# Patient Record
Sex: MALE | Race: OTHER | HISPANIC OR LATINO | URBAN - METROPOLITAN AREA
[De-identification: names, ages, dates, MRNs, and addresses within clinical notes are randomized per-mention and may not be internally consistent; named-entity substitution may affect disease eponyms.]

---

## 2017-05-18 ENCOUNTER — EMERGENCY (EMERGENCY)
Facility: HOSPITAL | Age: 27
LOS: 1 days | Discharge: HOME | End: 2017-05-18
Attending: EMERGENCY MEDICINE | Admitting: EMERGENCY MEDICINE
Payer: COMMERCIAL

## 2017-05-18 VITALS
DIASTOLIC BLOOD PRESSURE: 81 MMHG | TEMPERATURE: 98 F | OXYGEN SATURATION: 100 % | RESPIRATION RATE: 16 BRPM | HEART RATE: 88 BPM | SYSTOLIC BLOOD PRESSURE: 118 MMHG

## 2017-05-18 PROCEDURE — 99284 EMERGENCY DEPT VISIT MOD MDM: CPT

## 2017-05-18 NOTE — ED ADULT TRIAGE NOTE - CHIEF COMPLAINT QUOTE
pt c/o LLQ abdominal pain since Tuesday. pt c/o nausea but no vomiting. pt went to urgent care and states has food poision. pt appears comfortable and in NAD . pt hx appendectomy. pt denies any urinary symptoms.

## 2017-05-19 VITALS
OXYGEN SATURATION: 100 % | RESPIRATION RATE: 18 BRPM | HEART RATE: 67 BPM | DIASTOLIC BLOOD PRESSURE: 77 MMHG | TEMPERATURE: 98 F | SYSTOLIC BLOOD PRESSURE: 115 MMHG

## 2017-05-19 LAB — OB PNL STL: NEGATIVE — SIGNIFICANT CHANGE UP

## 2017-05-19 RX ORDER — CIPROFLOXACIN LACTATE 400MG/40ML
500 VIAL (ML) INTRAVENOUS ONCE
Qty: 0 | Refills: 0 | Status: COMPLETED | OUTPATIENT
Start: 2017-05-19 | End: 2017-05-19

## 2017-05-19 RX ORDER — MOXIFLOXACIN HYDROCHLORIDE TABLETS, 400 MG 400 MG/1
1 TABLET, FILM COATED ORAL
Qty: 10 | Refills: 0 | OUTPATIENT
Start: 2017-05-19 | End: 2017-05-24

## 2017-05-19 RX ADMIN — Medication 500 MILLIGRAM(S): at 02:09

## 2017-05-19 NOTE — ED PROVIDER NOTE - MEDICAL DECISION MAKING DETAILS
llq, +n, recently had abnormal tasting chicken, diarrhea is improving, afebrile, llq, +n, recently had abnormal tasting chicken, diarrhea is improving, afebrile, possible colitis, will check occult blood and dc with cipro, and pmd f/u

## 2017-05-19 NOTE — ED PROVIDER NOTE - OBJECTIVE STATEMENT
26M c/o LLQ abdominal pain since Tuesday. sharp pain, + passing gas; + mild nausea but no vomiting. + diarrhea - 3 episodes - watery, not brbpr; + dark stools; not taking any medications; no fever; + recently in [place], came back sunday; no sick contacts; no hematuria; went to urgent care on Tuesday, had a abnormal tasting piece of chicken; has BM this am; pain is 7/10, worse when passing gas; no testicular pain

## 2017-05-19 NOTE — ED ADULT NURSE NOTE - OBJECTIVE STATEMENT
pt. came in c/o LLQ pain x 3 days, non-radiating. denies any n/v/dysuria nor fever. pt. c/o having dark brown watery stools 2x. pt. states he might have had food poison. pt. was in [place] x 4 days, just arrived last Sunday, denies any sick contacts. awaits further MD bowles. will continue to monitor

## 2018-06-11 ENCOUNTER — EMERGENCY (EMERGENCY)
Facility: HOSPITAL | Age: 28
LOS: 1 days | Discharge: HOME | End: 2018-06-11
Attending: EMERGENCY MEDICINE
Payer: COMMERCIAL

## 2018-06-11 VITALS
TEMPERATURE: 98 F | RESPIRATION RATE: 16 BRPM | SYSTOLIC BLOOD PRESSURE: 124 MMHG | OXYGEN SATURATION: 98 % | HEART RATE: 70 BPM | DIASTOLIC BLOOD PRESSURE: 75 MMHG

## 2018-06-11 VITALS
TEMPERATURE: 98 F | SYSTOLIC BLOOD PRESSURE: 119 MMHG | OXYGEN SATURATION: 100 % | RESPIRATION RATE: 16 BRPM | HEART RATE: 71 BPM | WEIGHT: 177.91 LBS | DIASTOLIC BLOOD PRESSURE: 81 MMHG

## 2018-06-11 DIAGNOSIS — Z90.49 ACQUIRED ABSENCE OF OTHER SPECIFIED PARTS OF DIGESTIVE TRACT: Chronic | ICD-10-CM

## 2018-06-11 LAB
ALBUMIN SERPL ELPH-MCNC: 4.3 G/DL — SIGNIFICANT CHANGE UP (ref 3.3–5)
ALP SERPL-CCNC: 71 U/L — SIGNIFICANT CHANGE UP (ref 40–120)
ALT FLD-CCNC: 27 U/L — SIGNIFICANT CHANGE UP (ref 10–45)
ANION GAP SERPL CALC-SCNC: 15 MMOL/L — SIGNIFICANT CHANGE UP (ref 5–17)
APPEARANCE UR: CLEAR — SIGNIFICANT CHANGE UP
AST SERPL-CCNC: 20 U/L — SIGNIFICANT CHANGE UP (ref 10–40)
BASOPHILS # BLD AUTO: 0 K/UL — SIGNIFICANT CHANGE UP (ref 0–0.2)
BASOPHILS NFR BLD AUTO: 0.3 % — SIGNIFICANT CHANGE UP (ref 0–2)
BILIRUB SERPL-MCNC: 0.7 MG/DL — SIGNIFICANT CHANGE UP (ref 0.2–1.2)
BILIRUB UR-MCNC: NEGATIVE — SIGNIFICANT CHANGE UP
BUN SERPL-MCNC: 12 MG/DL — SIGNIFICANT CHANGE UP (ref 7–23)
CALCIUM SERPL-MCNC: 9.3 MG/DL — SIGNIFICANT CHANGE UP (ref 8.4–10.5)
CHLORIDE SERPL-SCNC: 103 MMOL/L — SIGNIFICANT CHANGE UP (ref 96–108)
CO2 SERPL-SCNC: 21 MMOL/L — LOW (ref 22–31)
COLOR SPEC: YELLOW — SIGNIFICANT CHANGE UP
CREAT SERPL-MCNC: 0.7 MG/DL — SIGNIFICANT CHANGE UP (ref 0.5–1.3)
DIFF PNL FLD: NEGATIVE — SIGNIFICANT CHANGE UP
EOSINOPHIL # BLD AUTO: 0.2 K/UL — SIGNIFICANT CHANGE UP (ref 0–0.5)
EOSINOPHIL NFR BLD AUTO: 1.6 % — SIGNIFICANT CHANGE UP (ref 0–6)
GAS PNL BLDV: SIGNIFICANT CHANGE UP
GLUCOSE SERPL-MCNC: 87 MG/DL — SIGNIFICANT CHANGE UP (ref 70–99)
GLUCOSE UR QL: NEGATIVE — SIGNIFICANT CHANGE UP
HCT VFR BLD CALC: 44.3 % — SIGNIFICANT CHANGE UP (ref 39–50)
HGB BLD-MCNC: 14.1 G/DL — SIGNIFICANT CHANGE UP (ref 13–17)
KETONES UR-MCNC: NEGATIVE — SIGNIFICANT CHANGE UP
LEUKOCYTE ESTERASE UR-ACNC: NEGATIVE — SIGNIFICANT CHANGE UP
LIDOCAIN IGE QN: 15 U/L — SIGNIFICANT CHANGE UP (ref 7–60)
LYMPHOCYTES # BLD AUTO: 18.6 % — SIGNIFICANT CHANGE UP (ref 13–44)
LYMPHOCYTES # BLD AUTO: 2.1 K/UL — SIGNIFICANT CHANGE UP (ref 1–3.3)
MCHC RBC-ENTMCNC: 27.6 PG — SIGNIFICANT CHANGE UP (ref 27–34)
MCHC RBC-ENTMCNC: 31.9 GM/DL — LOW (ref 32–36)
MCV RBC AUTO: 86.7 FL — SIGNIFICANT CHANGE UP (ref 80–100)
MONOCYTES # BLD AUTO: 1 K/UL — HIGH (ref 0–0.9)
MONOCYTES NFR BLD AUTO: 9.3 % — SIGNIFICANT CHANGE UP (ref 2–14)
NEUTROPHILS # BLD AUTO: 7.9 K/UL — HIGH (ref 1.8–7.4)
NEUTROPHILS NFR BLD AUTO: 70.2 % — SIGNIFICANT CHANGE UP (ref 43–77)
NITRITE UR-MCNC: NEGATIVE — SIGNIFICANT CHANGE UP
PH UR: 6.5 — SIGNIFICANT CHANGE UP (ref 5–8)
PLATELET # BLD AUTO: 226 K/UL — SIGNIFICANT CHANGE UP (ref 150–400)
POTASSIUM SERPL-MCNC: 4.4 MMOL/L — SIGNIFICANT CHANGE UP (ref 3.5–5.3)
POTASSIUM SERPL-SCNC: 4.4 MMOL/L — SIGNIFICANT CHANGE UP (ref 3.5–5.3)
PROT SERPL-MCNC: 8.3 G/DL — SIGNIFICANT CHANGE UP (ref 6–8.3)
PROT UR-MCNC: NEGATIVE — SIGNIFICANT CHANGE UP
RBC # BLD: 5.1 M/UL — SIGNIFICANT CHANGE UP (ref 4.2–5.8)
RBC # FLD: 12.2 % — SIGNIFICANT CHANGE UP (ref 10.3–14.5)
SODIUM SERPL-SCNC: 139 MMOL/L — SIGNIFICANT CHANGE UP (ref 135–145)
SP GR SPEC: 1.02 — SIGNIFICANT CHANGE UP (ref 1.01–1.02)
UROBILINOGEN FLD QL: NEGATIVE — SIGNIFICANT CHANGE UP
WBC # BLD: 11.2 K/UL — HIGH (ref 3.8–10.5)
WBC # FLD AUTO: 11.2 K/UL — HIGH (ref 3.8–10.5)

## 2018-06-11 PROCEDURE — 99284 EMERGENCY DEPT VISIT MOD MDM: CPT

## 2018-06-11 PROCEDURE — 80053 COMPREHEN METABOLIC PANEL: CPT

## 2018-06-11 PROCEDURE — 83605 ASSAY OF LACTIC ACID: CPT

## 2018-06-11 PROCEDURE — 74177 CT ABD & PELVIS W/CONTRAST: CPT | Mod: 26

## 2018-06-11 PROCEDURE — 82947 ASSAY GLUCOSE BLOOD QUANT: CPT

## 2018-06-11 PROCEDURE — 83690 ASSAY OF LIPASE: CPT

## 2018-06-11 PROCEDURE — 85027 COMPLETE CBC AUTOMATED: CPT

## 2018-06-11 PROCEDURE — 74177 CT ABD & PELVIS W/CONTRAST: CPT

## 2018-06-11 PROCEDURE — 84132 ASSAY OF SERUM POTASSIUM: CPT

## 2018-06-11 PROCEDURE — 82330 ASSAY OF CALCIUM: CPT

## 2018-06-11 PROCEDURE — 82803 BLOOD GASES ANY COMBINATION: CPT

## 2018-06-11 PROCEDURE — 85014 HEMATOCRIT: CPT

## 2018-06-11 PROCEDURE — 84295 ASSAY OF SERUM SODIUM: CPT

## 2018-06-11 PROCEDURE — 82435 ASSAY OF BLOOD CHLORIDE: CPT

## 2018-06-11 PROCEDURE — 81003 URINALYSIS AUTO W/O SCOPE: CPT

## 2018-06-11 NOTE — ED ADULT NURSE NOTE - OBJECTIVE STATEMENT
27 y m came to the ed from his pmd c/o llq abdominal pain. patient states the pain started saturday and has been intermittent dull pain. patient is a/ox3. denies any fevers, chills, chest pain, sob, n/v/d. abdomen is soft and nontender. denies decreased po intake. skin is warm and dry. denies pain/burning on urination.

## 2018-06-11 NOTE — ED PROVIDER NOTE - MEDICAL DECISION MAKING DETAILS
26 yo M presenting with LLQ pain, hx diverticulitis, well appearing on exam, cbc, cmp, CT abd, likely d/c with abx for uncomplicated diverticulitis 26 yo M presenting with LLQ pain, hx diverticulitis, well appearing on exam, cbc, cmp, CT abd, likely d/c with abx for uncomplicated diverticulitis    ADRIANNA Au MD: Pt is a 28 y/o M with hx diverticulitis (dx 1 yr ago clinically, no CT performed), appendectomy, who p/w 6/10 LLQ abdominal pain x 3 days, no f/c/n/v/diarrhea/constipation. + bloating, and pain wosre with passing gas- becomes sharp in nature. Denies passing blood in stool or dark tarry stools. Last BM this morning, normal consistency. DDx: diverticulitis, constipation, uti, other intraabdominal process. Plan: basic labs, CT A/P, pain control

## 2018-06-11 NOTE — ED ADULT NURSE REASSESSMENT NOTE - NS ED NURSE REASSESS COMMENT FT1
patient is resting in the room waiting for dispo. c/o minor abdominal pain but denies the need for pain medication. vss/nad. will continue to monitor.

## 2018-06-11 NOTE — ED PROVIDER NOTE - OBJECTIVE STATEMENT
28 yo M hx diverticulitis, appendectomy, presenting with 6/10 LLQ abdominal pain x 3 days, no f/c/n/v/diarrhea/constipation. + bloating, and pain wosre with passing gas- becomes sharp in nature. Denies passing blood in stool or dark tarry stools. Last BM this morning, normal consistency.         PCP Dr. Kobi Benjamin

## 2018-06-11 NOTE — ED PROVIDER NOTE - PLAN OF CARE
You were seen and evaluated in the emergency department for abdominal pain. You had a thorough evaluation including an exam, labs and imaging. You were given medications for comfort. Your workup did not demonstrate diverticulitis.   It is important to understand that while your workup was reassuring, no workup can completely exclude all concerning conditions. Therfore, please return if you develop worsening or concerning new symptoms including worsening pain, pain that spreads to new places, inability to tolerate an oral diet, new and worsening fevers and chills, weakness, inability to pass stool or flatulent gas, those included on the attached information sheets, or other findings concerning to you. Please take all your medications as prescribed.    Please be sure to follow up with your regular doctor in the next 2 days.

## 2018-06-11 NOTE — ED PROVIDER NOTE - CARE PLAN
Assessment and plan of treatment:	You were seen and evaluated in the emergency department for abdominal pain. You had a thorough evaluation including an exam, labs and imaging. You were given medications for comfort. Your workup did not demonstrate diverticulitis.   It is important to understand that while your workup was reassuring, no workup can completely exclude all concerning conditions. Therfore, please return if you develop worsening or concerning new symptoms including worsening pain, pain that spreads to new places, inability to tolerate an oral diet, new and worsening fevers and chills, weakness, inability to pass stool or flatulent gas, those included on the attached information sheets, or other findings concerning to you. Please take all your medications as prescribed.    Please be sure to follow up with your regular doctor in the next 2 days. Principal Discharge DX:	Abdominal pain  Assessment and plan of treatment:	You were seen and evaluated in the emergency department for abdominal pain. You had a thorough evaluation including an exam, labs and imaging. You were given medications for comfort. Your workup did not demonstrate diverticulitis.   It is important to understand that while your workup was reassuring, no workup can completely exclude all concerning conditions. Therfore, please return if you develop worsening or concerning new symptoms including worsening pain, pain that spreads to new places, inability to tolerate an oral diet, new and worsening fevers and chills, weakness, inability to pass stool or flatulent gas, those included on the attached information sheets, or other findings concerning to you. Please take all your medications as prescribed.    Please be sure to follow up with your regular doctor in the next 2 days. Principal Discharge DX:	Epiploic appendagitis  Assessment and plan of treatment:	You were seen and evaluated in the emergency department for abdominal pain. You had a thorough evaluation including an exam, labs and imaging. You were given medications for comfort. Your workup did not demonstrate diverticulitis.   It is important to understand that while your workup was reassuring, no workup can completely exclude all concerning conditions. Therfore, please return if you develop worsening or concerning new symptoms including worsening pain, pain that spreads to new places, inability to tolerate an oral diet, new and worsening fevers and chills, weakness, inability to pass stool or flatulent gas, those included on the attached information sheets, or other findings concerning to you. Please take all your medications as prescribed.    Please be sure to follow up with your regular doctor in the next 2 days.

## 2019-01-24 ENCOUNTER — INPATIENT (INPATIENT)
Facility: HOSPITAL | Age: 29
LOS: 0 days | Discharge: HOME | DRG: 313 | End: 2019-01-25
Attending: INTERNAL MEDICINE | Admitting: INTERNAL MEDICINE
Payer: COMMERCIAL

## 2019-01-24 VITALS
OXYGEN SATURATION: 96 % | WEIGHT: 190.92 LBS | HEART RATE: 92 BPM | SYSTOLIC BLOOD PRESSURE: 127 MMHG | RESPIRATION RATE: 16 BRPM | DIASTOLIC BLOOD PRESSURE: 83 MMHG | TEMPERATURE: 98 F

## 2019-01-24 DIAGNOSIS — Z90.49 ACQUIRED ABSENCE OF OTHER SPECIFIED PARTS OF DIGESTIVE TRACT: Chronic | ICD-10-CM

## 2019-01-24 DIAGNOSIS — I40.9 ACUTE MYOCARDITIS, UNSPECIFIED: ICD-10-CM

## 2019-01-24 LAB
ALBUMIN SERPL ELPH-MCNC: 3.9 G/DL — SIGNIFICANT CHANGE UP (ref 3.5–5)
ALP SERPL-CCNC: 81 U/L — SIGNIFICANT CHANGE UP (ref 40–120)
ALT FLD-CCNC: 48 U/L DA — SIGNIFICANT CHANGE UP (ref 10–60)
ANION GAP SERPL CALC-SCNC: 11 MMOL/L — SIGNIFICANT CHANGE UP (ref 5–17)
APTT BLD: 37.1 SEC — HIGH (ref 27.5–36.3)
AST SERPL-CCNC: 28 U/L — SIGNIFICANT CHANGE UP (ref 10–40)
BILIRUB SERPL-MCNC: 0.6 MG/DL — SIGNIFICANT CHANGE UP (ref 0.2–1.2)
BUN SERPL-MCNC: 8 MG/DL — SIGNIFICANT CHANGE UP (ref 7–18)
CALCIUM SERPL-MCNC: 9.4 MG/DL — SIGNIFICANT CHANGE UP (ref 8.4–10.5)
CHLORIDE SERPL-SCNC: 102 MMOL/L — SIGNIFICANT CHANGE UP (ref 96–108)
CO2 SERPL-SCNC: 26 MMOL/L — SIGNIFICANT CHANGE UP (ref 22–31)
CREAT SERPL-MCNC: 0.74 MG/DL — SIGNIFICANT CHANGE UP (ref 0.5–1.3)
GLUCOSE SERPL-MCNC: 103 MG/DL — HIGH (ref 70–99)
HCT VFR BLD CALC: 44.2 % — SIGNIFICANT CHANGE UP (ref 39–50)
HGB BLD-MCNC: 14.1 G/DL — SIGNIFICANT CHANGE UP (ref 13–17)
INR BLD: 1.03 RATIO — SIGNIFICANT CHANGE UP (ref 0.88–1.16)
MCHC RBC-ENTMCNC: 27.3 PG — SIGNIFICANT CHANGE UP (ref 27–34)
MCHC RBC-ENTMCNC: 31.8 GM/DL — LOW (ref 32–36)
MCV RBC AUTO: 85.9 FL — SIGNIFICANT CHANGE UP (ref 80–100)
PLATELET # BLD AUTO: 291 K/UL — SIGNIFICANT CHANGE UP (ref 150–400)
POTASSIUM SERPL-MCNC: 3.8 MMOL/L — SIGNIFICANT CHANGE UP (ref 3.5–5.3)
POTASSIUM SERPL-SCNC: 3.8 MMOL/L — SIGNIFICANT CHANGE UP (ref 3.5–5.3)
PROT SERPL-MCNC: 9.4 G/DL — HIGH (ref 6–8.3)
PROTHROM AB SERPL-ACNC: 11.4 SEC — SIGNIFICANT CHANGE UP (ref 10–12.9)
RBC # BLD: 5.15 M/UL — SIGNIFICANT CHANGE UP (ref 4.2–5.8)
RBC # FLD: 12.2 % — SIGNIFICANT CHANGE UP (ref 10.3–14.5)
SODIUM SERPL-SCNC: 139 MMOL/L — SIGNIFICANT CHANGE UP (ref 135–145)
TROPONIN I SERPL-MCNC: 2.9 NG/ML — HIGH (ref 0–0.04)
WBC # BLD: 14.9 K/UL — HIGH (ref 3.8–10.5)
WBC # FLD AUTO: 14.9 K/UL — HIGH (ref 3.8–10.5)

## 2019-01-24 PROCEDURE — 93010 ELECTROCARDIOGRAM REPORT: CPT | Mod: 76

## 2019-01-24 PROCEDURE — 99285 EMERGENCY DEPT VISIT HI MDM: CPT | Mod: 25

## 2019-01-24 PROCEDURE — 71045 X-RAY EXAM CHEST 1 VIEW: CPT | Mod: 26

## 2019-01-24 RX ORDER — ASPIRIN/CALCIUM CARB/MAGNESIUM 324 MG
324 TABLET ORAL ONCE
Qty: 0 | Refills: 0 | Status: COMPLETED | OUTPATIENT
Start: 2019-01-24 | End: 2019-01-24

## 2019-01-24 NOTE — ED PROVIDER NOTE - OBJECTIVE STATEMENT
28 male well, no pmh, fevers, cough, myalgia since 5 days ago, no cp or sob at that time, no n/v/d. approximately 2-3 hr pta developed sternal cp w no sob or pleuritic. no vomiting. no diaphoresis.

## 2019-01-24 NOTE — ED PROVIDER NOTE - PROGRESS NOTE DETAILS
reviewed the ekg w dr stephens. most likely myocarditis. repeated ekg is substantially unchanged. will admit for cardiac monitor and prob echo. flu swab ordered. considered pe but this is less likely - s1q3t3 minimally present

## 2019-01-24 NOTE — ED PROVIDER NOTE - PHYSICAL EXAMINATION
Gen: well appearing, of stated age, no acute distress; Head: NC, AT; ENT: MMM, no uvular deviation; Neck: supple with full ROM; Chest: CTAB, no retractions, rate normal, appears to breath comfortable; Heart: RRR S1S2 No JVD No peripheral edema b/l pulses 2+ in arm; Abd: Soft non-tender, no rebound or guarding, no masses, no hutchison sign, no mcburney tenderness; Back: No spinal deformity; Ext: Moving all 4 limbs without obvious impairment to ROM, no obvious weakness; Neuro: fluid speech, no focal deficits, oriented to person, place, situation; Psych: No anxiety, depression or pressured speech noted; Skin: no utricaria, no diffuse rash. -ncohen

## 2019-01-25 VITALS
TEMPERATURE: 99 F | DIASTOLIC BLOOD PRESSURE: 63 MMHG | HEART RATE: 84 BPM | SYSTOLIC BLOOD PRESSURE: 102 MMHG | RESPIRATION RATE: 17 BRPM | OXYGEN SATURATION: 99 %

## 2019-01-25 DIAGNOSIS — R07.9 CHEST PAIN, UNSPECIFIED: ICD-10-CM

## 2019-01-25 DIAGNOSIS — Z29.9 ENCOUNTER FOR PROPHYLACTIC MEASURES, UNSPECIFIED: ICD-10-CM

## 2019-01-25 DIAGNOSIS — Z90.49 ACQUIRED ABSENCE OF OTHER SPECIFIED PARTS OF DIGESTIVE TRACT: Chronic | ICD-10-CM

## 2019-01-25 PROBLEM — K57.92 DIVERTICULITIS OF INTESTINE, PART UNSPECIFIED, WITHOUT PERFORATION OR ABSCESS WITHOUT BLEEDING: Chronic | Status: ACTIVE | Noted: 2018-06-11

## 2019-01-25 LAB
ALBUMIN SERPL ELPH-MCNC: 3.3 G/DL — LOW (ref 3.5–5)
ALP SERPL-CCNC: 76 U/L — SIGNIFICANT CHANGE UP (ref 40–120)
ALT FLD-CCNC: 45 U/L DA — SIGNIFICANT CHANGE UP (ref 10–60)
ANION GAP SERPL CALC-SCNC: 8 MMOL/L — SIGNIFICANT CHANGE UP (ref 5–17)
AST SERPL-CCNC: 33 U/L — SIGNIFICANT CHANGE UP (ref 10–40)
BASOPHILS # BLD AUTO: 0.1 K/UL — SIGNIFICANT CHANGE UP (ref 0–0.2)
BASOPHILS NFR BLD AUTO: 0.5 % — SIGNIFICANT CHANGE UP (ref 0–2)
BILIRUB SERPL-MCNC: 0.7 MG/DL — SIGNIFICANT CHANGE UP (ref 0.2–1.2)
BUN SERPL-MCNC: 10 MG/DL — SIGNIFICANT CHANGE UP (ref 7–18)
CALCIUM SERPL-MCNC: 8.9 MG/DL — SIGNIFICANT CHANGE UP (ref 8.4–10.5)
CHLORIDE SERPL-SCNC: 105 MMOL/L — SIGNIFICANT CHANGE UP (ref 96–108)
CHOLEST SERPL-MCNC: 164 MG/DL — SIGNIFICANT CHANGE UP (ref 10–199)
CK MB BLD-MCNC: 7 % — HIGH (ref 0–3.5)
CK MB BLD-MCNC: 7.3 % — HIGH (ref 0–3.5)
CK MB CFR SERPL CALC: 12.2 NG/ML — HIGH (ref 0–3.6)
CK MB CFR SERPL CALC: 13.5 NG/ML — HIGH (ref 0–3.6)
CK SERPL-CCNC: 174 U/L — SIGNIFICANT CHANGE UP (ref 35–232)
CK SERPL-CCNC: 184 U/L — SIGNIFICANT CHANGE UP (ref 35–232)
CO2 SERPL-SCNC: 24 MMOL/L — SIGNIFICANT CHANGE UP (ref 22–31)
CREAT SERPL-MCNC: 0.7 MG/DL — SIGNIFICANT CHANGE UP (ref 0.5–1.3)
EBV EA AB SER IA-ACNC: <5 U/ML — SIGNIFICANT CHANGE UP
EBV EA AB TITR SER IF: POSITIVE
EBV EA IGG SER-ACNC: NEGATIVE — SIGNIFICANT CHANGE UP
EBV NA IGG SER IA-ACNC: >600 U/ML — HIGH
EBV PATRN SPEC IB-IMP: SIGNIFICANT CHANGE UP
EBV VCA IGG AVIDITY SER QL IA: POSITIVE
EBV VCA IGM SER IA-ACNC: 260 U/ML — HIGH
EBV VCA IGM SER IA-ACNC: <10 U/ML — SIGNIFICANT CHANGE UP
EBV VCA IGM TITR FLD: NEGATIVE — SIGNIFICANT CHANGE UP
EOSINOPHIL # BLD AUTO: 0.3 K/UL — SIGNIFICANT CHANGE UP (ref 0–0.5)
EOSINOPHIL NFR BLD AUTO: 2.4 % — SIGNIFICANT CHANGE UP (ref 0–6)
ERYTHROCYTE [SEDIMENTATION RATE] IN BLOOD: 51 MM/HR — HIGH (ref 0–15)
FLU A RESULT: SIGNIFICANT CHANGE UP
FLU A RESULT: SIGNIFICANT CHANGE UP
FLUAV AG NPH QL: SIGNIFICANT CHANGE UP
FLUBV AG NPH QL: SIGNIFICANT CHANGE UP
GLUCOSE SERPL-MCNC: 86 MG/DL — SIGNIFICANT CHANGE UP (ref 70–99)
HBA1C BLD-MCNC: 5.8 % — HIGH (ref 4–5.6)
HCT VFR BLD CALC: 41.7 % — SIGNIFICANT CHANGE UP (ref 39–50)
HCV AB S/CO SERPL IA: 0.16 S/CO — SIGNIFICANT CHANGE UP
HCV AB SERPL-IMP: SIGNIFICANT CHANGE UP
HDLC SERPL-MCNC: 30 MG/DL — LOW
HGB BLD-MCNC: 13.2 G/DL — SIGNIFICANT CHANGE UP (ref 13–17)
HIV 1+2 AB+HIV1 P24 AG SERPL QL IA: SIGNIFICANT CHANGE UP
LIPID PNL WITH DIRECT LDL SERPL: 111 MG/DL — SIGNIFICANT CHANGE UP
LYMPHOCYTES # BLD AUTO: 26.7 % — SIGNIFICANT CHANGE UP (ref 13–44)
LYMPHOCYTES # BLD AUTO: 3.2 K/UL — SIGNIFICANT CHANGE UP (ref 1–3.3)
MAGNESIUM SERPL-MCNC: 2.4 MG/DL — SIGNIFICANT CHANGE UP (ref 1.6–2.6)
MCHC RBC-ENTMCNC: 27.4 PG — SIGNIFICANT CHANGE UP (ref 27–34)
MCHC RBC-ENTMCNC: 31.5 GM/DL — LOW (ref 32–36)
MCV RBC AUTO: 86.9 FL — SIGNIFICANT CHANGE UP (ref 80–100)
MONOCYTES # BLD AUTO: 1.3 K/UL — HIGH (ref 0–0.9)
MONOCYTES NFR BLD AUTO: 11 % — SIGNIFICANT CHANGE UP (ref 2–14)
NEUTROPHILS # BLD AUTO: 7.2 K/UL — SIGNIFICANT CHANGE UP (ref 1.8–7.4)
NEUTROPHILS NFR BLD AUTO: 59.4 % — SIGNIFICANT CHANGE UP (ref 43–77)
PCP SPEC-MCNC: SIGNIFICANT CHANGE UP
PHOSPHATE SERPL-MCNC: 4.4 MG/DL — SIGNIFICANT CHANGE UP (ref 2.5–4.5)
PLATELET # BLD AUTO: 277 K/UL — SIGNIFICANT CHANGE UP (ref 150–400)
POTASSIUM SERPL-MCNC: 3.8 MMOL/L — SIGNIFICANT CHANGE UP (ref 3.5–5.3)
POTASSIUM SERPL-SCNC: 3.8 MMOL/L — SIGNIFICANT CHANGE UP (ref 3.5–5.3)
PROT SERPL-MCNC: 8.5 G/DL — HIGH (ref 6–8.3)
RBC # BLD: 4.8 M/UL — SIGNIFICANT CHANGE UP (ref 4.2–5.8)
RBC # FLD: 12.1 % — SIGNIFICANT CHANGE UP (ref 10.3–14.5)
RSV RESULT: SIGNIFICANT CHANGE UP
RSV RNA RESP QL NAA+PROBE: SIGNIFICANT CHANGE UP
SODIUM SERPL-SCNC: 137 MMOL/L — SIGNIFICANT CHANGE UP (ref 135–145)
TOTAL CHOLESTEROL/HDL RATIO MEASUREMENT: 5.5 RATIO — SIGNIFICANT CHANGE UP (ref 3.4–9.6)
TRIGL SERPL-MCNC: 114 MG/DL — SIGNIFICANT CHANGE UP (ref 10–149)
TROPONIN I SERPL-MCNC: 2.25 NG/ML — HIGH (ref 0–0.04)
TROPONIN I SERPL-MCNC: 3.39 NG/ML — HIGH (ref 0–0.04)
TSH SERPL-MCNC: 1.15 UU/ML — SIGNIFICANT CHANGE UP (ref 0.34–4.82)
VIT B12 SERPL-MCNC: 735 PG/ML — SIGNIFICANT CHANGE UP (ref 232–1245)
WBC # BLD: 12.1 K/UL — HIGH (ref 3.8–10.5)
WBC # FLD AUTO: 12.1 K/UL — HIGH (ref 3.8–10.5)

## 2019-01-25 PROCEDURE — 80307 DRUG TEST PRSMV CHEM ANLYZR: CPT

## 2019-01-25 PROCEDURE — 93306 TTE W/DOPPLER COMPLETE: CPT

## 2019-01-25 PROCEDURE — 80053 COMPREHEN METABOLIC PANEL: CPT

## 2019-01-25 PROCEDURE — 86664 EPSTEIN-BARR NUCLEAR ANTIGEN: CPT

## 2019-01-25 PROCEDURE — 87389 HIV-1 AG W/HIV-1&-2 AB AG IA: CPT

## 2019-01-25 PROCEDURE — 84443 ASSAY THYROID STIM HORMONE: CPT

## 2019-01-25 PROCEDURE — 36415 COLL VENOUS BLD VENIPUNCTURE: CPT

## 2019-01-25 PROCEDURE — 87040 BLOOD CULTURE FOR BACTERIA: CPT

## 2019-01-25 PROCEDURE — 86665 EPSTEIN-BARR CAPSID VCA: CPT

## 2019-01-25 PROCEDURE — 85652 RBC SED RATE AUTOMATED: CPT

## 2019-01-25 PROCEDURE — G0378: CPT

## 2019-01-25 PROCEDURE — 85730 THROMBOPLASTIN TIME PARTIAL: CPT

## 2019-01-25 PROCEDURE — 71045 X-RAY EXAM CHEST 1 VIEW: CPT

## 2019-01-25 PROCEDURE — 86803 HEPATITIS C AB TEST: CPT

## 2019-01-25 PROCEDURE — 87899 AGENT NOS ASSAY W/OPTIC: CPT

## 2019-01-25 PROCEDURE — 83735 ASSAY OF MAGNESIUM: CPT

## 2019-01-25 PROCEDURE — 84484 ASSAY OF TROPONIN QUANT: CPT

## 2019-01-25 PROCEDURE — 83036 HEMOGLOBIN GLYCOSYLATED A1C: CPT

## 2019-01-25 PROCEDURE — 82550 ASSAY OF CK (CPK): CPT

## 2019-01-25 PROCEDURE — 86663 EPSTEIN-BARR ANTIBODY: CPT

## 2019-01-25 PROCEDURE — 85027 COMPLETE CBC AUTOMATED: CPT

## 2019-01-25 PROCEDURE — 93005 ELECTROCARDIOGRAM TRACING: CPT

## 2019-01-25 PROCEDURE — 85610 PROTHROMBIN TIME: CPT

## 2019-01-25 PROCEDURE — 84100 ASSAY OF PHOSPHORUS: CPT

## 2019-01-25 PROCEDURE — 82607 VITAMIN B-12: CPT

## 2019-01-25 PROCEDURE — 99285 EMERGENCY DEPT VISIT HI MDM: CPT | Mod: 25

## 2019-01-25 PROCEDURE — 82553 CREATINE MB FRACTION: CPT

## 2019-01-25 PROCEDURE — 80061 LIPID PANEL: CPT

## 2019-01-25 PROCEDURE — 87631 RESP VIRUS 3-5 TARGETS: CPT

## 2019-01-25 RX ORDER — ASPIRIN/CALCIUM CARB/MAGNESIUM 324 MG
500 TABLET ORAL THREE TIMES A DAY
Qty: 0 | Refills: 0 | Status: DISCONTINUED | OUTPATIENT
Start: 2019-01-25 | End: 2019-01-25

## 2019-01-25 RX ORDER — ASPIRIN/CALCIUM CARB/MAGNESIUM 324 MG
325 TABLET ORAL THREE TIMES A DAY
Qty: 0 | Refills: 0 | Status: DISCONTINUED | OUTPATIENT
Start: 2019-01-25 | End: 2019-01-25

## 2019-01-25 RX ORDER — SODIUM CHLORIDE 9 MG/ML
1000 INJECTION INTRAMUSCULAR; INTRAVENOUS; SUBCUTANEOUS
Qty: 0 | Refills: 0 | Status: DISCONTINUED | OUTPATIENT
Start: 2019-01-25 | End: 2019-01-25

## 2019-01-25 RX ORDER — ASPIRIN/CALCIUM CARB/MAGNESIUM 324 MG
81 TABLET ORAL DAILY
Qty: 0 | Refills: 0 | Status: DISCONTINUED | OUTPATIENT
Start: 2019-01-25 | End: 2019-01-25

## 2019-01-25 RX ORDER — ASPIRIN/CALCIUM CARB/MAGNESIUM 324 MG
650 TABLET ORAL
Qty: 0 | Refills: 0 | Status: DISCONTINUED | OUTPATIENT
Start: 2019-01-25 | End: 2019-01-25

## 2019-01-25 RX ORDER — BENZOCAINE AND MENTHOL 5; 1 G/100ML; G/100ML
1 LIQUID ORAL THREE TIMES A DAY
Qty: 0 | Refills: 0 | Status: DISCONTINUED | OUTPATIENT
Start: 2019-01-25 | End: 2019-01-25

## 2019-01-25 RX ORDER — METOPROLOL TARTRATE 50 MG
12.5 TABLET ORAL
Qty: 0 | Refills: 0 | Status: DISCONTINUED | OUTPATIENT
Start: 2019-01-25 | End: 2019-01-25

## 2019-01-25 RX ORDER — ATORVASTATIN CALCIUM 80 MG/1
40 TABLET, FILM COATED ORAL AT BEDTIME
Qty: 0 | Refills: 0 | Status: DISCONTINUED | OUTPATIENT
Start: 2019-01-25 | End: 2019-01-25

## 2019-01-25 RX ADMIN — SODIUM CHLORIDE 70 MILLILITER(S): 9 INJECTION INTRAMUSCULAR; INTRAVENOUS; SUBCUTANEOUS at 06:24

## 2019-01-25 RX ADMIN — SODIUM CHLORIDE 70 MILLILITER(S): 9 INJECTION INTRAMUSCULAR; INTRAVENOUS; SUBCUTANEOUS at 03:06

## 2019-01-25 RX ADMIN — Medication 81 MILLIGRAM(S): at 11:40

## 2019-01-25 RX ADMIN — Medication 324 MILLIGRAM(S): at 00:14

## 2019-01-25 RX ADMIN — Medication 12.5 MILLIGRAM(S): at 06:23

## 2019-01-25 NOTE — DISCHARGE NOTE ADULT - PATIENT PORTAL LINK FT
You can access the [facility], offered by [facility], by registering with the following website: [email or web address]

## 2019-01-25 NOTE — DISCHARGE NOTE ADULT - PLAN OF CARE
Rule out acute coronary syndrome You were admitted to the hospital with chest pain. Your cardiac enzymes were elevated, but were trending down inpatient. Echocardiogram showed ____. You are medically clear for discharge. You were admitted to the hospital with chest pain. Your cardiac enzymes were elevated, but were trending down inpatient. Echocardiogram showed normal systolic heart function. You are medically clear for discharge. Follow up with Dr. Ma in 1 week for an outpatient stress test.

## 2019-01-25 NOTE — H&P ADULT - ATTENDING COMMENTS
Will continue to trend the trop  Will need 2D echo and will determine further management once EF is known

## 2019-01-25 NOTE — DISCHARGE NOTE ADULT - HOSPITAL COURSE
28 male with no PMH . comes in with complaint of chest pain. Patient reports pain in central part of chest , which is 6-8/10 in intensity, not radiating to back or arms.     Admitted to [facility] for ACS rule out. EKG unremarkable. First two troponins elevated and rising - third troponin shows downward trend. No chest pain during hospitalization reported. Echocardiogram showed _____. Patient medically stable for discharge. 28 male with no PMH . comes in with complaint of chest pain. Patient reports pain in central part of chest , which is 6-8/10 in intensity, not radiating to back or arms.     Admitted to [facility] for ACS rule out. EKG unremarkable. First two troponins elevated and rising - third troponin shows downward trend. No chest pain during hospitalization reported. Echocardiogram showed normal LV function with grade II diastolic dysfunction. Patient medically stable for discharge for outpatient follow up with Dr. Ma in 1 week for outpatient stress test.

## 2019-01-25 NOTE — PROGRESS NOTE ADULT - PROBLEM SELECTOR PLAN 1
pain has resolved  cardiac enzymes elevated x2 (2.9 --> 3.9)  continue to trend CE  echo pending  c/w tele monitoring  c/w asa, statin, bblocker pain has resolved  cardiac enzymes peaked and downtrending now  will stop trending  echo pending  c/w tele monitoring  c/w asa, statin, bblocker - stop if echo normal  d/c planning if echo normal

## 2019-01-25 NOTE — PROGRESS NOTE ADULT - ASSESSMENT
28 male with no PMH . comes in with complaint of chest pain. Patient reports pain in central part of chest , which is 6-8/10 in intensity, not radiating to back or arms. In Ed , BP: 127/82 , Hr :92 , Temp : 98.2 F  Cbc shows white count of 14   Bmp shows T1 2.9   CXR WNL  EKG WNL    Admitted for ACS r/o and possible myocarditis. Cardiac enzymes continue to rise. Will continue to trend. Echo pending. C/w tele monitoring. 28 male with no PMH . comes in with complaint of chest pain. Patient reports pain in central part of chest , which is 6-8/10 in intensity, not radiating to back or arms. In Ed , BP: 127/82 , Hr :92 , Temp : 98.2 F  Cbc shows white count of 14   Bmp shows T1 2.9   CXR WNL  EKG WNL    Admitted for ACS r/o and possible myocarditis. Cardiac enzymes peaked and trending down. Echo pending. C/w tele monitoring.    If echo normal, will d/c. Discussed with Dr. Ma. 28 male with no PMH . comes in with complaint of chest pain. Patient reports pain in central part of chest , which is 6-8/10 in intensity, not radiating to back or arms. In Ed , BP: 127/82 , Hr :92 , Temp : 98.2 F  Cbc shows white count of 14   Bmp shows T1 2.9   CXR WNL  EKG WNL    Admitted for ACS r/o and possible myocarditis. Cardiac enzymes peaked and trending down. Echo pending. C/w tele monitoring.    If echo is normal, will d/c. Discussed with Dr. Ma.

## 2019-01-25 NOTE — H&P ADULT - PROBLEM SELECTOR PLAN 1
Complaints of chest pain, which is pleuritic , with preceding URI symptoms   Could be myocarditis   Less likely acs given no risk factors or family history   Leucocytosis on labs  Will obtain Differential count to r/o eosinophilia   Will obtain full RVP, run tests for EBV , CMV , ESR , CRP , HIV , Hepatitis , Strep antigen , blood cultures to r/o some basic causes of myocarditis   will start on asa, stain , b-blocker for now   Echocardiogram   Monitor on [facility]   Mild IVF

## 2019-01-25 NOTE — PROGRESS NOTE ADULT - SUBJECTIVE AND OBJECTIVE BOX
PGY1 Note discussed with supervising resident and primary attending.    Patient is a 28y old  Male who presents with a chief complaint of chest pain (25 Jan 2019 01:44)      INTERVAL HPI/OVERNIGHT EVENTS:    MEDICATIONS  (STANDING):  aspirin 650 milliGRAM(s) Oral two times a day  aspirin enteric coated 81 milliGRAM(s) Oral daily  atorvastatin 40 milliGRAM(s) Oral at bedtime  metoprolol tartrate 12.5 milliGRAM(s) Oral two times a day  sodium chloride 0.9%. 1000 milliLiter(s) (70 mL/Hr) IV Continuous <Continuous>    MEDICATIONS  (PRN):  benzocaine 15 mG/menthol 3.6 mG Lozenge 1 Lozenge Oral three times a day PRN Sore Throat    Allergies    No Known Allergies    Intolerances      REVIEW OF SYSTEMS:  CONSTITUTIONAL: No fever, weight loss, or fatigue  RESPIRATORY: No cough, wheezing, chills or hemoptysis; No shortness of breath  CARDIOVASCULAR: No chest pain, palpitations, dizziness, or leg swelling  GASTROINTESTINAL: No abdominal or epigastric pain. No nausea, vomiting, or hematemesis; No diarrhea or constipation. No melena or hematochezia.  NEUROLOGICAL: No headaches, memory loss, loss of strength, numbness, or tremors  SKIN: No itching, burning, rashes, or lesions     Vital Signs Last 24 Hrs  T(C): 37.2 (25 Jan 2019 11:39), Max: 37.2 (25 Jan 2019 11:39)  T(F): 98.9 (25 Jan 2019 11:39), Max: 98.9 (25 Jan 2019 11:39)  HR: 80 (25 Jan 2019 11:39) (78 - 92)  BP: 108/73 (25 Jan 2019 11:39) (99/73 - 127/83)  BP(mean): --  RR: 18 (25 Jan 2019 11:39) (16 - 20)  SpO2: 98% (25 Jan 2019 11:39) (96% - 100%)    PHYSICAL EXAM:  GENERAL: NAD, well-developed  HEAD:  Atraumatic, Normocephalic  EYES: EOMI, PERRLA, conjunctiva and sclera clear  NECK: Supple, No JVD, Normal thyroid  CHEST/LUNG: Clear to percussion bilaterally; No rales, rhonchi, wheezing, or rubs  HEART: Regular rate and rhythm; No murmurs, rubs, or gallops  ABDOMEN: Soft, Nontender, Nondistended; Bowel sounds present  NERVOUS SYSTEM:  Alert & Oriented X3, Good concentration; Motor Strength 5/5 B/L   EXTREMITIES:  2+ Peripheral Pulses, No clubbing, cyanosis, or edema      LABS:                                   13.2   12.1  )-----------( 277      ( 25 Jan 2019 07:14 )             41.7     01-25    137  |  105  |  10  ----------------------------<  86  3.8   |  24  |  0.70    Ca    8.9      25 Jan 2019 07:14  Phos  4.4     01-25  Mg     2.4     01-25    TPro  8.5<H>  /  Alb  3.3<L>  /  TBili  0.7  /  DBili  x   /  AST  33  /  ALT  45  /  AlkPhos  76  01-25      TPro  8.5<H>  /  Alb  3.3<L>  /  TBili  0.7  /  DBili  x   /  AST  33  /  ALT  45  /  AlkPhos  76  01-25    PT/INR - ( 24 Jan 2019 21:36 )   PT: 11.4 sec;   INR: 1.03 ratio         PTT - ( 24 Jan 2019 21:36 )  PTT:37.1 sec    CAPILLARY BLOOD GLUCOSE        RADIOLOGY & ADDITIONAL TESTS:    Imaging Personally Reviewed:  [X] YES  [ ] NO    Consultant(s) Notes Reviewed:  [X] YES  [ ] NO

## 2019-01-25 NOTE — DISCHARGE NOTE ADULT - CARE PLAN
Principal Discharge DX:	Chest pain, unspecified  Goal:	Rule out acute coronary syndrome  Assessment and plan of treatment:	You were admitted to the hospital with chest pain. Your cardiac enzymes were elevated, but were trending down inpatient. Echocardiogram showed ____. You are medically clear for discharge. Principal Discharge DX:	Chest pain, unspecified  Goal:	Rule out acute coronary syndrome  Assessment and plan of treatment:	You were admitted to the hospital with chest pain. Your cardiac enzymes were elevated, but were trending down inpatient. Echocardiogram showed normal systolic heart function. You are medically clear for discharge. Follow up with Dr. Ma in 1 week for an outpatient stress test.

## 2019-01-25 NOTE — PROGRESS NOTE ADULT - PROBLEM SELECTOR PLAN 2
IMPROVE VTE Individual Risk Assessment          RISK                                                          Points  [  ] Previous VTE                                                3  [  ] Thrombophilia                                             2  [  ] Lower limb paralysis                                   2        (unable to hold up >15 seconds)    [  ] Current Cancer                                             2         (within 6 months)  [  ] Immobilization > 24 hrs                              1  [  ] ICU/CCU stay > 24 hours                             1  [  ] Age > 60                                                         1    IMPROVE VTE Score: 0   No indication of dvt and gi ppx.

## 2019-01-25 NOTE — H&P ADULT - NSHPLABSRESULTS_GEN_ALL_CORE
14.1   14.9  )-----------( 291      ( 24 Jan 2019 21:36 )             44.2       01-24    139  |  102  |  8   ----------------------------<  103<H>  3.8   |  26  |  0.74    Ca    9.4      24 Jan 2019 21:36    TPro  9.4<H>  /  Alb  3.9  /  TBili  0.6  /  DBili  x   /  AST  28  /  ALT  48  /  AlkPhos  81  01-24 14.1   14.9  )-----------( 291      ( 24 Jan 2019 21:36 )             44.2       01-24    139  |  102  |  8   ----------------------------<  103<H>  3.8   |  26  |  0.74    Ca    9.4      24 Jan 2019 21:36    TPro  9.4<H>  /  Alb  3.9  /  TBili  0.6  /  DBili  x   /  AST  28  /  ALT  48  /  AlkPhos  81  01-24    Trop trending up now 3.39

## 2019-01-25 NOTE — ED ADULT NURSE NOTE - ED STAT RN HANDOFF DETAILS
pt.remained   stable. denies chest pain. on CM with NSR.transfer to  502 B report given to laure gilliland.pt.not in distress.

## 2019-01-25 NOTE — DISCHARGE NOTE ADULT - ADDITIONAL INSTRUCTIONS
Follow up with your primary doctor in 1 week. Follow up with Dr. Ma in 1 week for an outpatient stress test.

## 2019-01-25 NOTE — H&P ADULT - ASSESSMENT
28 male with no PMH . comes in with complaint of chest pain. Patient reports pain in central part of chest , which is 6-8/10 in intensity, not radiating to back or arms. In Ed , BP: 127/82 , Hr :92 , Temp : 98.2 F  Cbc shows white count of 14   Bmp shows T1 2.9   CXR WNL  EKG WNL    Will admit to telemetry for ACS r/o and possible Myocarditis.

## 2019-01-25 NOTE — DISCHARGE NOTE ADULT - CARE PROVIDER_API CALL
Gary Ma), Cardiology Medicine  [address] [place] [address]  Phone: [phone]  Fax: [phone]

## 2019-01-25 NOTE — H&P ADULT - HISTORY OF PRESENT ILLNESS
28 male with no PMH . comes in with complaint of chest pain. Patient reports pain in central part of chest , which is 6-8/10 in intensity, not radiating to back or arms. Patient's symptoms started on Sunday when he had a fever, was high grade , but he did not measure temp, improved with tylenol. Then he started having cough , which was mainly dry and cold and sore throat. Patient then developed chest pain today , which resolved by the time he came to  ER.   Patient also had diaphoresis with fever. No nausea , vomiting , abdominal pain , diarrhea , palpitations or sob.   No h/o similar symptoms , no recent hospitalizations or travel. No sick contacts.     In Ed , BP: 127/82 , Hr :92 , Temp : 98.2 F  Cbc shows white count of 14   Bmp shows T1 2.9   CXR WNL  EKG WNL

## 2019-01-25 NOTE — H&P ADULT - NSHPPHYSICALEXAM_GEN_ALL_CORE
PHYSICAL EXAM:  GENERAL: NAD  HEENT: Normocephalic;  conjunctivae and sclerae clear; moist mucous membranes;   NECK : supple  CHEST/LUNG: Clear to auscultation bilaterally with good air entry   HEART: S1 S2  regular; no murmurs, gallops or rubs  ABDOMEN: Soft, Nontender, Nondistended; Bowel sounds present  EXTREMITIES: no cyanosis; no edema; no calf tenderness  SKIN: warm and dry; no rash  NERVOUS SYSTEM:  Awake and alert; Oriented  to place, person and time ; no new deficits PHYSICAL EXAM:  GENERAL: NAD  HEENT: Normocephalic;  conjunctivae and sclerae clear; moist mucous membranes;   NECK : supple  CHEST/LUNG: Clear to auscultation bilaterally with good air entry   HEART: S1 S2  regular; II/VI murmurs, gallops or rubs  ABDOMEN: Soft, Nontender, Nondistended; Bowel sounds present  EXTREMITIES: no cyanosis; no edema; no calf tenderness  SKIN: warm and dry; no rash  NERVOUS SYSTEM:  Awake and alert; Oriented  to place, person and time ; no new deficits

## 2019-01-25 NOTE — H&P ADULT - PROBLEM SELECTOR PLAN 2
IMPROVE VTE Individual Risk Assessment          RISK                                                          Points  [  ] Previous VTE                                                3  [  ] Thrombophilia                                             2  [  ] Lower limb paralysis                                   2        (unable to hold up >15 seconds)    [  ] Current Cancer                                             2         (within 6 months)  [  ] Immobilization > 24 hrs                              1  [  ] ICU/CCU stay > 24 hours                             1  [  ] Age > 60                                                         1    IMPROVE VTE Score: 0   No indication of dvt and gi ppx

## 2019-01-25 NOTE — ED ADULT NURSE NOTE - NSIMPLEMENTINTERV_GEN_ALL_ED
Implemented All Universal Safety Interventions:  [place] to call system. Call bell, personal items and telephone within reach. Instruct patient to call for assistance. Room bathroom lighting operational. Non-slip footwear when patient is off stretcher. Physically safe environment: no spills, clutter or unnecessary equipment. Stretcher in lowest position, wheels locked, appropriate side rails in place.

## 2019-01-26 LAB — S PNEUM AG SER QL: SIGNIFICANT CHANGE UP

## 2019-01-28 LAB
CMV DNA CSF QL NAA+PROBE: SIGNIFICANT CHANGE UP
CMV DNA SPEC NAA+PROBE-LOG#: SIGNIFICANT CHANGE UP LOGIU/ML

## 2019-01-30 LAB
CULTURE RESULTS: SIGNIFICANT CHANGE UP
SPECIMEN SOURCE: SIGNIFICANT CHANGE UP

## 2021-01-08 ENCOUNTER — EMERGENCY (EMERGENCY)
Facility: HOSPITAL | Age: 31
LOS: 1 days | Discharge: HOME | End: 2021-01-08
Attending: EMERGENCY MEDICINE
Payer: COMMERCIAL

## 2021-01-08 VITALS
TEMPERATURE: 98 F | SYSTOLIC BLOOD PRESSURE: 138 MMHG | DIASTOLIC BLOOD PRESSURE: 84 MMHG | RESPIRATION RATE: 17 BRPM | HEART RATE: 88 BPM | OXYGEN SATURATION: 97 %

## 2021-01-08 DIAGNOSIS — Z90.49 ACQUIRED ABSENCE OF OTHER SPECIFIED PARTS OF DIGESTIVE TRACT: Chronic | ICD-10-CM

## 2021-01-08 LAB
RAPID RVP RESULT: DETECTED
RV+EV RNA SPEC QL NAA+PROBE: DETECTED
SARS-COV-2 RNA SPEC QL NAA+PROBE: SIGNIFICANT CHANGE UP

## 2021-01-08 PROCEDURE — 99283 EMERGENCY DEPT VISIT LOW MDM: CPT

## 2021-01-08 PROCEDURE — 0225U NFCT DS DNA&RNA 21 SARSCOV2: CPT

## 2021-01-08 NOTE — ED PROVIDER NOTE - PATIENT PORTAL LINK FT
You can access the FollowMyHealth Patient Portal offered by [facility] by registering at the following website: [email or web address]. By joining [employer]’s FollowMyHealth portal, you will also be able to view your health information using other applications (apps) compatible with our system.

## 2021-01-08 NOTE — ED ADULT NURSE NOTE - CAS ELECT INFOMATION PROVIDED
Patient seen and examined by MD/ACP. Patient swabbed for COVID-19 and discharged by MD/ACP. Verbal instructions provided and verbalized understanding/DC instructions

## 2021-01-08 NOTE — ED PROVIDER NOTE - CLINICAL SUMMARY MEDICAL DECISION MAKING FREE TEXT BOX
Patient presents for runny nose and cough, requesting COVID testing.  Patient not tachypneic or hypoxic.  Will ensure stable for discharge, provide covid instructions, return precautions provided, will advise to self isolate.

## 2021-01-08 NOTE — ED PROVIDER NOTE - OBJECTIVE STATEMENT
30 year old male no past medical or surgical history presents for COVID testing.  States that he has had runny nose and mild cough in the morning for the last 3-4 days.  States rest of his house tested and was negative for COVID. Patient denies any fevers, chills, cp, sob or other concerning s/s.

## 2021-01-22 ENCOUNTER — EMERGENCY (EMERGENCY)
Facility: HOSPITAL | Age: 31
LOS: 1 days | Discharge: HOME | End: 2021-01-22
Attending: EMERGENCY MEDICINE
Payer: COMMERCIAL

## 2021-01-22 VITALS
DIASTOLIC BLOOD PRESSURE: 82 MMHG | OXYGEN SATURATION: 96 % | RESPIRATION RATE: 18 BRPM | SYSTOLIC BLOOD PRESSURE: 143 MMHG | HEART RATE: 64 BPM | TEMPERATURE: 98 F | WEIGHT: 199.96 LBS

## 2021-01-22 DIAGNOSIS — Z90.49 ACQUIRED ABSENCE OF OTHER SPECIFIED PARTS OF DIGESTIVE TRACT: Chronic | ICD-10-CM

## 2021-01-22 LAB — SARS-COV-2 RNA SPEC QL NAA+PROBE: SIGNIFICANT CHANGE UP

## 2021-01-22 PROCEDURE — U0005: CPT

## 2021-01-22 PROCEDURE — 99283 EMERGENCY DEPT VISIT LOW MDM: CPT

## 2021-01-22 PROCEDURE — 87635 SARS-COV-2 COVID-19 AMP PRB: CPT

## 2021-01-22 RX ORDER — ALBUTEROL 90 UG/1
2 AEROSOL, METERED ORAL
Qty: 1 | Refills: 0
Start: 2021-01-22 | End: 2021-02-20

## 2021-01-22 NOTE — ED PROVIDER NOTE - PATIENT PORTAL LINK FT
You can access the FollowMyHealth Patient Portal offered by [facility] by registering at the following website: [email or web address]. By joining [employer]’s FollowMyHealth portal, you will also be able to view your health information using other applications (apps) compatible with our system.

## 2021-01-22 NOTE — ED PROVIDER NOTE - OBJECTIVE STATEMENT
31 y/o M pt with a PMHx of diverticulitis, and a significant PSHx of appendectomy, presents to the ED for COVID test. Patient reports he was exposed to COVID + person 1 week ago. Notes he has a cough, denies fever, chills or any other complaints.

## 2021-01-22 NOTE — ED ADULT TRIAGE NOTE - CHIEF COMPLAINT QUOTE
Benefits, risks, and possible complications of procedure explained to patient/caregiver who verbalized understanding and gave verbal consent. pateint exp[osed tonm (+) covid patient

## 2023-06-27 NOTE — ED PROVIDER NOTE - DOMESTIC TRAVEL HIGH RISK QUESTION
No Performing Laboratory: 0 Billing Type: Third-Party Bill Bill For Surgical Tray: no Expected Date Of Service: 06/27/2023

## 2024-09-30 ENCOUNTER — NON-APPOINTMENT (OUTPATIENT)
Age: 34
End: 2024-09-30

## 2024-09-30 ENCOUNTER — APPOINTMENT (OUTPATIENT)
Age: 34
End: 2024-09-30
Payer: COMMERCIAL

## 2024-09-30 PROCEDURE — 92004 COMPRE OPH EXAM NEW PT 1/>: CPT

## 2025-09-09 PROBLEM — Z00.00 ENCOUNTER FOR PREVENTIVE HEALTH EXAMINATION: Status: ACTIVE | Noted: 2025-09-09

## 2025-09-16 ENCOUNTER — NON-APPOINTMENT (OUTPATIENT)
Age: 35
End: 2025-09-16

## 2025-09-17 ENCOUNTER — APPOINTMENT (OUTPATIENT)
Dept: GASTROENTEROLOGY | Facility: CLINIC | Age: 35
End: 2025-09-17
Payer: COMMERCIAL

## 2025-09-17 ENCOUNTER — TRANSCRIPTION ENCOUNTER (OUTPATIENT)
Age: 35
End: 2025-09-17

## 2025-09-17 DIAGNOSIS — Z78.9 OTHER SPECIFIED HEALTH STATUS: ICD-10-CM

## 2025-09-17 DIAGNOSIS — K31.89 OTHER DISEASES OF STOMACH AND DUODENUM: ICD-10-CM

## 2025-09-17 DIAGNOSIS — K21.9 GASTRO-ESOPHAGEAL REFLUX DISEASE W/OUT ESOPHAGITIS: ICD-10-CM

## 2025-09-17 PROCEDURE — 99203 OFFICE O/P NEW LOW 30 MIN: CPT | Mod: 95

## 2025-09-17 RX ORDER — OMEPRAZOLE 20 MG/1
20 CAPSULE, DELAYED RELEASE ORAL
Refills: 0 | Status: ACTIVE | COMMUNITY